# Patient Record
(demographics unavailable — no encounter records)

---

## 2024-10-07 NOTE — PROCEDURE
[Suspected Polycystic Ovaries] : suspected polycystic ovaries [F/U Abnormal US] : f/u abnormal ultrasound [Transvaginal Ultrasound] : transvaginal ultrasound

## 2024-10-07 NOTE — PLAN
[FreeTextEntry1] : 1)  Self breast exam instructions, mammography discussed with the patient. 2)  Lipid profile assessment, TSH screening, fasting glucose testing, and vitamin D supplementation were discussed with the patient. 3)  Maintain healthy weight. 4)  Regular health maintenance with PCP. 5)  Remain tobacco free. 6)  Limit alcohol intake to less than 5 drinks per week. 7)  Osteoporosis screening and colonoscopy screening. 8)  Annual cholesterol screening. 9)  Annual influenza vaccine. 10) The importance of routine physical activity was reviewed and a goal of 150 minutes of moderately vigorous exercise per week was endorsed.   Yearly breast cancer screening with no current clinical or radiographic concerns. The patient was reminded regarding future well breast and general healthcare, breast cancer risk reduction, the importance of self-examination and the need for follow up. She was again reminded of the need to take Vit D3 2500 IU daily or to keep a Vit D level above 30, and Turmeric 1000 mg daily with Black Pepper. Plan continued yearly imaging and breast follow up, sooner as needed. I counseled the patient on current recommendations to reduce breast cancer risk including but not inclusive to regular exercise 20-30 minutes 3-4 times a week, low fat diet, limiting alcohol consumption, maintenance of ideal body weight, yearly imaging and self-breast awareness. Questions answered. I encouraged in light of COVID-19, social distancing, frequent hand washing and precautions to stay healthy.  The risk, benefits, and alternatives to an OCP were discussed and all questions were answered. Currently there are no contraindications of taking hormonal birth control. Instructions for usage and ASA for travel were discussed. Please schedule a follow-up appointment in 6 months. STD prevention was discussed with the patient. Risks, benefits, alternatives of hormonal contraceptive use were discussed with the patient including but not limited to risk of contraceptive failure, deep venous thrombosis or embolism, cardiovascular disease, sexually transmitted disease transmission without use of barrier method. Self-breast exam instructions were discussed with the patient.  Patient to get a thyroid sonogram. Thyroid labs drawn in office today. Patient referred to an endocrinologist.   Patient also referred to an URO/GYN for urinary issue. Patient encouraged to sufficiently hydrate.   I have spent 30 minutes of time on this encounter. Greater than 50% of the face-to-face encounter time was spent on counseling and/or coordination of care for examination findings, differential, testing, management and planning.

## 2024-10-07 NOTE — HISTORY OF PRESENT ILLNESS
[Y] : Patient is sexually active [N] : Patient denies prior pregnancies [No] : Patient does not have concerns regarding sex [Currently Active] : currently active [TextBox_4] : The 26-year-old patient presents today for a follow-up visit and birth control refill. Patient is doing well. She states that she feels she has to force urine out and states the urine is dark colored. All questions were answered in easy-to-understand language. [Mammogramdate] : NA [BreastSonogramDate] : NA [PapSmeardate] : 03/04/2024 [BoneDensityDate] : NA [ColonoscopyDate] : NA [TextBox_78] : No history of HPV [LMPDate] : 10/02/24

## 2024-10-07 NOTE — PHYSICAL EXAM
[Chaperone Present] : A chaperone was present in the examining room during all aspects of the physical examination [Appropriately responsive] : appropriately responsive [Alert] : alert [No Acute Distress] : no acute distress [No Lymphadenopathy] : no lymphadenopathy [Goiter] : goiter [Soft] : soft [Non-tender] : non-tender [Non-distended] : non-distended [No HSM] : No HSM [No Lesions] : no lesions [No Mass] : no mass [Oriented x3] : oriented x3 [Examination Of The Breasts] : a normal appearance [Normal] : normal [No Masses] : no breast masses were palpable [FreeTextEntry2] : Vivienne Garcia [FreeTextEntry3] : This note was written by Vivienne Garcia on 10/07/2024, acting as a scribe for Dr. Timo Saul MD. All medic record entries were at my, Dr. Timo Saul MD, direction and personally dictated by me in 10/07/2024. I have personally reviewed the chart and agree that the record accurately reflects my personal performance of the history, physical exam, assessment, and plan.

## 2024-10-07 NOTE — REASON FOR VISIT
[Follow-Up] : a follow-up evaluation of [Urinary Complaints] : urinary complaints [Ad Hoc ] : provided by an ad hoc  [FreeTextEntry2] : 6 month birth control  [Interpreters_FullName] : Leonela [Interpreters_Relationshiptopatient] : Medical Assistant [TWNoteComboBox1] : Jeremi